# Patient Record
Sex: MALE | Race: WHITE | NOT HISPANIC OR LATINO | Employment: OTHER | URBAN - METROPOLITAN AREA
[De-identification: names, ages, dates, MRNs, and addresses within clinical notes are randomized per-mention and may not be internally consistent; named-entity substitution may affect disease eponyms.]

---

## 2021-01-15 DIAGNOSIS — Z23 NEED FOR VACCINATION: ICD-10-CM

## 2022-03-07 PROBLEM — M23.322 MEDIAL MENISCUS, POSTERIOR HORN DERANGEMENT, LEFT: Status: ACTIVE | Noted: 2022-03-07

## 2022-03-30 ENCOUNTER — TELEPHONE (OUTPATIENT)
Dept: SURGERY | Facility: MEDICAL CENTER | Age: 77
End: 2022-03-30
Payer: COMMERCIAL

## 2022-07-18 ENCOUNTER — TELEPHONE (OUTPATIENT)
Dept: CARDIOLOGY | Facility: MEDICAL CENTER | Age: 77
End: 2022-07-18

## 2022-07-18 NOTE — TELEPHONE ENCOUNTER
Chart Prep    Called patient in regards to records for NP appointment with Dr. Harris To review most recent lab results, ekg, any cardiac testing or ,if he has been treated by a cardiologist. No answer, unable to leave voicemail to call back due to voicemail not being set up.

## 2022-08-01 ENCOUNTER — RESEARCH ENCOUNTER (OUTPATIENT)
Dept: CARDIOLOGY | Facility: MEDICAL CENTER | Age: 77
End: 2022-08-01

## 2022-08-01 ENCOUNTER — OFFICE VISIT (OUTPATIENT)
Dept: CARDIOLOGY | Facility: MEDICAL CENTER | Age: 77
End: 2022-08-01
Payer: OTHER GOVERNMENT

## 2022-08-01 VITALS
BODY MASS INDEX: 38.08 KG/M2 | HEIGHT: 71 IN | RESPIRATION RATE: 18 BRPM | SYSTOLIC BLOOD PRESSURE: 160 MMHG | DIASTOLIC BLOOD PRESSURE: 80 MMHG | WEIGHT: 272 LBS | OXYGEN SATURATION: 91 % | HEART RATE: 94 BPM

## 2022-08-01 DIAGNOSIS — R94.31 ABNORMAL EKG: ICD-10-CM

## 2022-08-01 DIAGNOSIS — Z00.6 RESEARCH STUDY PATIENT: ICD-10-CM

## 2022-08-01 DIAGNOSIS — M79.89 LEG SWELLING: ICD-10-CM

## 2022-08-01 DIAGNOSIS — Z91.89 OTHER SPECIFIED PERSONAL RISK FACTORS, NOT ELSEWHERE CLASSIFIED: ICD-10-CM

## 2022-08-01 DIAGNOSIS — R06.09 DYSPNEA ON EXERTION: ICD-10-CM

## 2022-08-01 DIAGNOSIS — I10 HTN (HYPERTENSION), MALIGNANT: ICD-10-CM

## 2022-08-01 DIAGNOSIS — I49.1 PREMATURE ATRIAL COMPLEX: ICD-10-CM

## 2022-08-01 PROCEDURE — 99204 OFFICE O/P NEW MOD 45 MIN: CPT | Performed by: INTERNAL MEDICINE

## 2022-08-01 PROCEDURE — 93000 ELECTROCARDIOGRAM COMPLETE: CPT | Performed by: INTERNAL MEDICINE

## 2022-08-01 RX ORDER — BUPROPION HYDROCHLORIDE 150 MG/1
150 TABLET ORAL DAILY
COMMUNITY
Start: 2022-02-03 | End: 2022-08-01

## 2022-08-01 ASSESSMENT — ENCOUNTER SYMPTOMS
MYALGIAS: 0
DIAPHORESIS: 0
BLURRED VISION: 0
SENSORY CHANGE: 0
FALLS: 0
DIZZINESS: 0
FEVER: 0
ABDOMINAL PAIN: 0
MEMORY LOSS: 0
SHORTNESS OF BREATH: 1
COUGH: 0
HEADACHES: 0
DEPRESSION: 0
BRUISES/BLEEDS EASILY: 0
DOUBLE VISION: 0
PALPITATIONS: 0

## 2022-08-01 NOTE — PROGRESS NOTES
Chief Complaint   Patient presents with   • Abnormal EKG     NP f/V DX: Abnormal EKG        Subjective     Carlo Weaver is a 76 y.o. male who presents today for worsening exertional dyspnea. No prior heart problems. No prior cardiac procedures or surgeries. No chest pain. No syncope.    Patient does get winded upon walking up inclines or for distance. No symptoms at rest or with daily living activities.    I have personally interpreted EKG today with patient, there is no evidence of acute coronary syndrome, no evidence of prior infarct, normal UT and QT interval, no significant conduction disease. Sinus rhythm.    No family history of sudden cardiac death.      History reviewed. No pertinent past medical history.  Past Surgical History:   Procedure Laterality Date   • PB KNEE SCOPE,MED/LAT MENISECTOMY Left 4/15/2022    Procedure: Left knee arthroscopic partial medial meniscectomy;  Surgeon: Avel Pierson M.D.;  Location: [facility];  Service: Orthopedics     History reviewed. No pertinent family history.  Social History     Socioeconomic History   • Marital status: Unknown     Spouse name: Not on file   • Number of children: Not on file   • Years of education: Not on file   • Highest education level: Not on file   Occupational History   • Not on file   Tobacco Use   • Smoking status: Never Smoker   • Smokeless tobacco: Never Used   Vaping Use   • Vaping Use: Never used   Substance and Sexual Activity   • Alcohol use: Never   • Drug use: Never   • Sexual activity: Not on file   Other Topics Concern   • Not on file   Social History Narrative   • Not on file     Social Determinants of Health     Financial Resource Strain: Not on file   Food Insecurity: Not on file   Transportation Needs: Not on file   Physical Activity: Not on file   Stress: Not on file   Social Connections: Not on file   Intimate Partner Violence: Not on file   Housing Stability: Not on file     No Known Allergies  Outpatient  "Encounter Medications as of 8/1/2022   Medication Sig Dispense Refill   • buPROPion (WELLBUTRIN XL) 150 MG XL tablet Take 150 mg by mouth 2 times a day.     • mirtazapine (REMERON) 45 MG tablet TAKE 3 TABLETS BY MOUTH EVERY NIGHT AT BEDTIME     • FLUoxetine (PROZAC) 20 MG Cap Take 20 mg by mouth every day.     • clonazePAM (KLONOPIN) 1 MG Tab TAKE 1 TABLET BY MOUTH FOUR TIMES DAILY     • [DISCONTINUED] buPROPion (WELLBUTRIN XL) 150 MG XL tablet Take 150 mg by mouth every day. (Patient not taking: Reported on 8/1/2022)       No facility-administered encounter medications on file as of 8/1/2022.     Review of Systems   Constitutional: Negative for diaphoresis and fever.   HENT: Negative for nosebleeds.    Eyes: Negative for blurred vision and double vision.   Respiratory: Positive for shortness of breath. Negative for cough.    Cardiovascular: Negative for chest pain and palpitations.   Gastrointestinal: Negative for abdominal pain.   Genitourinary: Negative for dysuria and frequency.   Musculoskeletal: Negative for falls and myalgias.   Skin: Negative for rash.   Neurological: Negative for dizziness, sensory change and headaches.   Endo/Heme/Allergies: Does not bruise/bleed easily.   Psychiatric/Behavioral: Negative for depression and memory loss.              Objective     BP (!) 160/80 (BP Location: Left arm, Patient Position: Sitting, BP Cuff Size: Adult)   Pulse 94   Resp 18   Ht 1.803 m (5' 11\")   Wt 123 kg (272 lb)   SpO2 91%   BMI 37.94 kg/m²     Physical Exam  Vitals and nursing note reviewed.   Constitutional:       General: He is not in acute distress.     Appearance: He is not diaphoretic.   HENT:      Head: Normocephalic and atraumatic.      Right Ear: External ear normal.      Left Ear: External ear normal.      Nose: No congestion or rhinorrhea.   Eyes:      General:         Right eye: No discharge.         Left eye: No discharge.   Neck:      Thyroid: No thyromegaly.      Vascular: No JVD. "   Cardiovascular:      Rate and Rhythm: Normal rate and regular rhythm.      Pulses: Normal pulses.   Pulmonary:      Effort: No respiratory distress.   Abdominal:      General: There is no distension.      Tenderness: There is no abdominal tenderness.   Musculoskeletal:         General: No swelling or tenderness.      Right lower leg: Edema present.      Left lower leg: Edema present.   Skin:     General: Skin is warm and dry.   Neurological:      Mental Status: He is alert and oriented to person, place, and time.      Cranial Nerves: No cranial nerve deficit.   Psychiatric:         Behavior: Behavior normal.         Assessment & Plan     1. Dyspnea on exertion  EC-ECHOCARDIOGRAM COMPLETE W/O CONT    NM-CARDIAC STRESS TEST    REFERRAL TO SLEEP STUDIES    Referral to Pulmonary and Sleep Medicine    PULMONARY FUNCTION TESTS -Test requested: Complete Pulmonary Function Test   2. Abnormal EKG  EKG   3. Premature atrial complex  NM-CARDIAC STRESS TEST    Referral to Pulmonary and Sleep Medicine   4. Leg swelling  EC-ECHOCARDIOGRAM COMPLETE W/O CONT    US-EXTREMITY VENOUS LOWER BILAT   5. HTN (hypertension), malignant  EC-ECHOCARDIOGRAM COMPLETE W/O CONT   6. Other specified personal risk factors, not elsewhere classified  NM-CARDIAC STRESS TEST    Referral to Pulmonary and Sleep Medicine       Medical Decision Making: Today's Assessment/Status/Plan:   At this time, to further risk stratify, I will order a transthoracic echocardiogram to assess cardiac and valvular functions. I will also order a myocardial nuclear scan stress test to assess for coronary ischemia.    Blood pressure is elevated today, but patient would like to defer treatment at this time.    We will also check lower extremities venous duplex to assess the anatomy for her lower extremities venous system.    I will refer patient to have sleep studies for obstructive sleep apnea.

## 2022-08-02 LAB — EKG IMPRESSION: NORMAL

## 2022-08-04 ENCOUNTER — HOSPITAL ENCOUNTER (OUTPATIENT)
Dept: RADIOLOGY | Facility: MEDICAL CENTER | Age: 77
End: 2022-08-04
Attending: INTERNAL MEDICINE
Payer: COMMERCIAL

## 2022-08-04 DIAGNOSIS — I49.1 PREMATURE ATRIAL COMPLEX: ICD-10-CM

## 2022-08-04 DIAGNOSIS — R06.09 DYSPNEA ON EXERTION: ICD-10-CM

## 2022-08-04 DIAGNOSIS — Z91.89 OTHER SPECIFIED PERSONAL RISK FACTORS, NOT ELSEWHERE CLASSIFIED: ICD-10-CM

## 2022-08-04 PROCEDURE — A9502 TC99M TETROFOSMIN: HCPCS

## 2022-08-04 PROCEDURE — 700111 HCHG RX REV CODE 636 W/ 250 OVERRIDE (IP)

## 2022-08-04 RX ORDER — REGADENOSON 0.08 MG/ML
INJECTION, SOLUTION INTRAVENOUS
Status: COMPLETED
Start: 2022-08-04 | End: 2022-08-04

## 2022-08-04 RX ORDER — REGADENOSON 0.08 MG/ML
0.4 INJECTION, SOLUTION INTRAVENOUS ONCE
Status: COMPLETED | OUTPATIENT
Start: 2022-08-04 | End: 2022-08-04

## 2022-08-04 RX ADMIN — REGADENOSON 0.4 MG: 0.08 INJECTION, SOLUTION INTRAVENOUS at 15:47

## 2022-08-05 PROCEDURE — 93018 CV STRESS TEST I&R ONLY: CPT | Performed by: INTERNAL MEDICINE

## 2022-08-05 PROCEDURE — 78452 HT MUSCLE IMAGE SPECT MULT: CPT | Mod: 26 | Performed by: INTERNAL MEDICINE

## 2022-08-16 LAB
APOB+LDLR+PCSK9 GENE MUT ANL BLD/T: NOT DETECTED
BRCA1+BRCA2 DEL+DUP + FULL MUT ANL BLD/T: NOT DETECTED
MLH1+MSH2+MSH6+PMS2 GN DEL+DUP+FUL M: NOT DETECTED

## 2022-08-31 ENCOUNTER — APPOINTMENT (OUTPATIENT)
Dept: RADIOLOGY | Facility: MEDICAL CENTER | Age: 77
End: 2022-08-31
Attending: INTERNAL MEDICINE
Payer: OTHER GOVERNMENT

## 2022-09-01 ENCOUNTER — HOSPITAL ENCOUNTER (OUTPATIENT)
Dept: PULMONOLOGY | Facility: MEDICAL CENTER | Age: 77
End: 2022-09-01
Attending: INTERNAL MEDICINE
Payer: OTHER GOVERNMENT

## 2022-09-01 DIAGNOSIS — R06.09 DYSPNEA ON EXERTION: ICD-10-CM

## 2022-09-01 PROCEDURE — 94726 PLETHYSMOGRAPHY LUNG VOLUMES: CPT | Mod: 26 | Performed by: INTERNAL MEDICINE

## 2022-09-01 PROCEDURE — 94060 EVALUATION OF WHEEZING: CPT

## 2022-09-01 PROCEDURE — 94060 EVALUATION OF WHEEZING: CPT | Mod: 26 | Performed by: INTERNAL MEDICINE

## 2022-09-01 PROCEDURE — 94729 DIFFUSING CAPACITY: CPT

## 2022-09-01 PROCEDURE — 94726 PLETHYSMOGRAPHY LUNG VOLUMES: CPT

## 2022-09-01 PROCEDURE — 94729 DIFFUSING CAPACITY: CPT | Mod: 26 | Performed by: INTERNAL MEDICINE

## 2022-09-01 RX ORDER — ALBUTEROL SULFATE 2.5 MG/3ML
2.5 SOLUTION RESPIRATORY (INHALATION)
Status: DISCONTINUED | OUTPATIENT
Start: 2022-09-01 | End: 2022-09-02 | Stop reason: HOSPADM

## 2022-09-01 RX ADMIN — ALBUTEROL SULFATE 2.5 MG: 2.5 SOLUTION RESPIRATORY (INHALATION) at 15:32

## 2022-09-01 ASSESSMENT — PULMONARY FUNCTION TESTS
FEV1/FVC_PERCENT_PREDICTED: 95
FVC: 2.73
FVC_LLN: 3.51
FEV1/FVC_PERCENT_PREDICTED: 75
FVC_PERCENT_PREDICTED: 64
FEV1: 1.76
FEV1_LLN: 2.62
FEV1/FVC_PERCENT_LLN: 63
FEV1: 1.94
FVC_LLN: 3.51
FEV1_PERCENT_CHANGE: -3
FEV1/FVC_PERCENT_PREDICTED: 94
FVC: 2.82
FEV1/FVC: 62
FEV1/FVC_PREDICTED: 75
FVC_PREDICTED: 4.21
FEV1_PREDICTED: 3.14
FEV1/FVC_PERCENT_CHANGE: -333
FEV1/FVC_PERCENT_PREDICTED: 84
FEV1_PERCENT_CHANGE: 10
FEV1/FVC: 62
FEV1_PERCENT_PREDICTED: 61
FVC_PERCENT_PREDICTED: 67
FEV1/FVC_PERCENT_CHANGE: 13
FEV1_PERCENT_PREDICTED: 56
FEV1_LLN: 2.62
FEV1/FVC_PERCENT_LLN: 63
FEV1/FVC: 71
FEV1/FVC_PERCENT_PREDICTED: 82
FEV1/FVC: 71.06

## 2022-09-02 NOTE — PROCEDURES
DATE OF SERVICE:  09/01/2022     The results of this test meet the ATS standards for acceptability and   repeatability.     SPIROMETRY:  There is evidence of obstruction, manifested by loud ratio of   FEV1/FVC at 62%.  Noted that the FEV1 was 61% in the post-bronchodilator arm,   which correlates to 1.94 liters.     Although there was a trend to response to bronchodilators, this did not meet   the strict criteria from ATS.     LUNG VOLUMES:  There is evidence of air trapping manifested by an elevated RV   at 144%.  Noted that the TLC is still within normal limits at 89%.     DIFFUSION CAPACITY STUDY:  There is no impairment in the gas transfer,   manifested by a DLCO of 140%.     Flow volume curves: The flow volume curves correlates with the information above.     CONCLUSION:  There is evidence of moderate obstruction with air trapping,   clinical and radiographic correlation is recommended for entities such as COPD, overlap syndrome, asthma, among others.      Tacho Ward MD [facility]  Pulmonary/Critical Care          ______________________________  Tacho Ward MD    [facility] [place]    DD:  09/01/2022 19:27  DT:  09/01/2022 19:42    Job#:  [id number]

## 2022-11-16 ENCOUNTER — HOSPITAL ENCOUNTER (OUTPATIENT)
Dept: CARDIOLOGY | Facility: MEDICAL CENTER | Age: 77
End: 2022-11-16
Attending: INTERNAL MEDICINE
Payer: COMMERCIAL

## 2022-11-16 DIAGNOSIS — I10 HTN (HYPERTENSION), MALIGNANT: ICD-10-CM

## 2022-11-16 DIAGNOSIS — R06.09 DYSPNEA ON EXERTION: ICD-10-CM

## 2022-11-16 DIAGNOSIS — M79.89 LEG SWELLING: ICD-10-CM

## 2022-11-16 LAB
LV EJECT FRACT  99904: 60
LV EJECT FRACT MOD 4C 99902: 52.64

## 2022-11-16 PROCEDURE — 93306 TTE W/DOPPLER COMPLETE: CPT | Mod: 26 | Performed by: INTERNAL MEDICINE

## 2022-11-16 PROCEDURE — 93306 TTE W/DOPPLER COMPLETE: CPT

## 2022-11-17 NOTE — RESULT ENCOUNTER NOTE
Dear Kemi,    Can you please let Carlo Weaver know that result is ok and I will see patient as scheduled?    Thank you,  Josse.

## 2022-11-18 ENCOUNTER — OFFICE VISIT (OUTPATIENT)
Dept: CARDIOLOGY | Facility: MEDICAL CENTER | Age: 77
End: 2022-11-18
Payer: COMMERCIAL

## 2022-11-18 VITALS
HEART RATE: 88 BPM | HEIGHT: 71 IN | SYSTOLIC BLOOD PRESSURE: 134 MMHG | BODY MASS INDEX: 37.66 KG/M2 | RESPIRATION RATE: 18 BRPM | WEIGHT: 269 LBS | DIASTOLIC BLOOD PRESSURE: 94 MMHG | OXYGEN SATURATION: 94 %

## 2022-11-18 DIAGNOSIS — R94.31 ABNORMAL EKG: ICD-10-CM

## 2022-11-18 DIAGNOSIS — Z71.89 COUNSELING ON HEALTH PROMOTION AND DISEASE PREVENTION: ICD-10-CM

## 2022-11-18 DIAGNOSIS — R03.0 ELEVATED BLOOD PRESSURE READING: ICD-10-CM

## 2022-11-18 DIAGNOSIS — E66.09 CLASS 2 OBESITY DUE TO EXCESS CALORIES WITHOUT SERIOUS COMORBIDITY WITH BODY MASS INDEX (BMI) OF 35.0 TO 35.9 IN ADULT: ICD-10-CM

## 2022-11-18 DIAGNOSIS — I51.7 LVH (LEFT VENTRICULAR HYPERTROPHY): ICD-10-CM

## 2022-11-18 DIAGNOSIS — J44.9 OBSTRUCTIVE AIRWAY DISEASE (HCC): ICD-10-CM

## 2022-11-18 DIAGNOSIS — R06.09 DYSPNEA ON EXERTION: Primary | ICD-10-CM

## 2022-11-18 PROCEDURE — 99203 OFFICE O/P NEW LOW 30 MIN: CPT | Performed by: INTERNAL MEDICINE

## 2022-11-18 RX ORDER — ALBUTEROL SULFATE 2.5 MG/3ML
2.5 SOLUTION RESPIRATORY (INHALATION) EVERY 6 HOURS PRN
Qty: 1 EACH | Refills: 3 | Status: SHIPPED | OUTPATIENT
Start: 2022-11-18 | End: 2023-07-19

## 2022-11-18 NOTE — PROGRESS NOTES
CARDIOLOGY Follow Up PATIENT:    PCP: JUAN LUIS Benavides    1. Dyspnea on exertion    2. Elevated blood pressure reading    3. Class 2 obesity due to excess calories without serious comorbidity with body mass index (BMI) of 35.0 to 35.9 in adult    4. LVH (left ventricular hypertrophy)    5. Abnormal EKG    6. Obstructive airway disease (HCC)    7. Counseling on health promotion and disease prevention        Carlo Weaver here for follow up regarding his SOB and recent testing.    Chief Complaint   Patient presents with    Shortness of Breath       History: Carlo Weaver is a 77 y.o. male evaluated in our clinic by Dr. Melendez for dyspnea on exertion and abnormal EKG on 8/1/22, and subsequently had a TTE and stress test formed. Has psoriasis and depression. Never smoked, never exposed to tobacco.    Had COVID January 2020 and ever since been having the SOB issues.    Continues to have low exercise tolerance, RENAE, no more having DENNY. No orthopnea, PND, LH, dizziness, syncope, presyncope, CP. No exercise routine, sedentary.    No BP check at home. No machine.    Reportedly negative C scope few months ago.    No added salt to food but does not watch salt intake.    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    Lipid panel 11/16/22:  , HDL 62,     Normal CBC, CrCl, AST and ALT 11/2022.    PFT 9/1/22:  There is evidence of moderate obstruction with air trapping, clinical and radiographic correlation is recommended for entities such as COPD, overlap syndrome, asthma, among others.  Tacho Ward MD FACP  Pulmonary/Critical Care    TTE 11/16/22:  No prior study is available for comparison.   Normal left ventricular systolic function.   No evidence of valvular abnormality based on Doppler evaluation.  Grade I diastolic dysfunction.  Mild LVH.  Unable to estimate right ventricular systolic   pressure due to an inadequate tricuspid regurgitant jet.    Cardiac nuclear stress test 8/5/22:   NUCLEAR IMAGING  "INTERPRETATION   No evidence of significant jeopardized viable myocardium or prior myocardial    infarction.   Normal left ventricular size, ejection fraction, and wall motion.   ECG INTERPRETATION   Negative stress ECG for ischemia.  Normal TID 0.98      PE:  BP (!) 134/94 (BP Location: Left arm, Patient Position: Sitting, BP Cuff Size: Adult)   Pulse 88   Resp 18   Ht 1.803 m (5' 11\")   Wt 122 kg (269 lb)   SpO2 94%   BMI 37.52 kg/m²     Gen: well  HEENT: Symmetric face. Anicteric sclerae. Moist mucus membranes  NECK: No JVD. No lymphadenopathy  CARDIAC: Regular, Normal S1, S2, No murmur  VASCULATURE: carotids are normal bilaterally without bruit  RESP: Clear to auscultation bilaterally  ABD: Soft, non-tender, non-distended  EXT: Trace lower extremity edema, no clubbing or cyanosis  SKIN: Warm and dry  NEURO: No gross deficits  PSYCH: Appropriate affect, participates in conversation    The ASCVD Risk score (Geneva DK, et al., 2019) failed to calculate.    History reviewed. No pertinent past medical history.  Past Surgical History:   Procedure Laterality Date    PB KNEE SCOPE,MED/LAT MENISECTOMY Left 4/15/2022    Procedure: Left knee arthroscopic partial medial meniscectomy;  Surgeon: Avel Pierson M.D.;  Location: [facility];  Service: Orthopedics     No Known Allergies  Outpatient Encounter Medications as of 11/18/2022   Medication Sig Dispense Refill    neomycin sulf/polymyx B sulf/HC soln (CORTISPORIN HC SOL) 3.5-53068-4 Solution INSTILL 4 DROPS TO AFFECTED EAR THREE TIMES DAILY      tiotropium (SPIRIVA RESPIMAT) 2.5 mcg/Act Aero Soln Inhale 2 Inhalation every day. 1 Each 3    albuterol (PROVENTIL) 2.5mg/3ml Nebu Soln solution for nebulization Take 3 mL by nebulization every 6 hours as needed for Shortness of Breath. 1 Each 3    buPROPion (WELLBUTRIN XL) 150 MG XL tablet Take 1 Tablet by mouth 2 times a day.      mirtazapine (REMERON) 45 MG tablet TAKE 3 TABLETS BY MOUTH EVERY " NIGHT AT BEDTIME      FLUoxetine (PROZAC) 20 MG Cap Take 1 Capsule by mouth every day.      clonazePAM (KLONOPIN) 1 MG Tab TAKE 1 TABLET BY MOUTH FOUR TIMES DAILY       No facility-administered encounter medications on file as of 11/18/2022.     Social History     Socioeconomic History    Marital status: Single     Spouse name: Not on file    Number of children: Not on file    Years of education: Not on file    Highest education level: Not on file   Occupational History    Not on file   Tobacco Use    Smoking status: Never    Smokeless tobacco: Never   Vaping Use    Vaping Use: Never used   Substance and Sexual Activity    Alcohol use: Yes     Comment: very rarely    Drug use: Never    Sexual activity: Not on file   Other Topics Concern    Not on file   Social History Narrative    Not on file     Social Determinants of Health     Financial Resource Strain: Not on file   Food Insecurity: Not on file   Transportation Needs: Not on file   Physical Activity: Not on file   Stress: Not on file   Social Connections: Not on file   Intimate Partner Violence: Not on file   Housing Stability: Not on file     Family History   Problem Relation Age of Onset    Lung Disease Mother     Diabetes Mother     Cancer Father     Heart Disease Neg Hx          Studies    No results found for: TSHULTRASEN   No results found for: FREET4   No results found for: HBA1C  No results found for: CHOLSTRLTOT, LDL, HDL, TRIGLYCERIDE    No results found for: SODIUM, POTASSIUM, CHLORIDE, CO2, GLUCOSE, BUN, CREATININE, BUNCREATRAT, GLOMRATE  No results found for: ALKPHOSPHAT, ASTSGOT, ALTSGPT, TBILIRUBIN     Echocardiogram:  No results found for this or any previous visit.        Assessment and Recommendations:    Problem List Items Addressed This Visit       LVH (left ventricular hypertrophy)    Dyspnea on exertion - Primary    Relevant Medications    tiotropium (SPIRIVA RESPIMAT) 2.5 mcg/Act Aero Soln    albuterol (PROVENTIL) 2.5mg/3ml Nebu Soln  solution for nebulization    Elevated blood pressure reading    Class 2 obesity due to excess calories without serious comorbidity with body mass index (BMI) of 35.0 to 35.9 in adult     Other Visit Diagnoses       Abnormal EKG        Obstructive airway disease (HCC)        Relevant Medications    tiotropium (SPIRIVA RESPIMAT) 2.5 mcg/Act Aero Soln    albuterol (PROVENTIL) 2.5mg/3ml Nebu Soln solution for nebulization    Counseling on health promotion and disease prevention              I reviewed the recent echocardiogram and stress test result findings with  and advised him to send me a blood pressure log, a.m. and p.m., in 1 week to ensure controlled blood pressure at home especially given elevated blood pressure reading in clinic today with LVH finding on his transthoracic echocardiogram.    He is scheduled to follow-up with pulmonary 1/11/23 given his abnormal PFT findings of obstructive lung disease.  I also prescribed him albuterol as needed with Spiriva daily inhalers given that he is not seeing pulmonary on till next year.  These medications to be further managed by either PCP or pulmonary medicine.    Accordingly, can further advise regarding medication changes especially if hypertension is confirmed.  If after treating hypertension (if present) and Spiriva use, his symptoms are still persistent of RENAE, we will arrange for a right heart catheterization.      After the visit, we obtained records for his recent lab work at [facility], estimated 10-year ASCVD risk about 25% however guidelines recommend moderate intensity statin might be reasonable, class IIb indication given his age older than 75. It is also a class IIb indication to obtain a coronary calcium score for further risk assessment.  I reach out to the patient regarding these recommendations via [employer] after the visit.    Discussed with patient in details importance of eating a heart healthy diet and participating in more aerobic exercise, at  least 30 minutes a day to help with his weight loss; general cardiovascular fitness and probably deconditioning state.    Thank you for the opportunity to be involved in Carlo Weaver 's care; and please reach out with any questions or concerns.    Return in about 3 months (around 2/18/2023).    Dashawn Jackson MD, MPH [facility]  Interventional Cardiologist  [facility]   of Clinical Internal Medicine - [facility]    ~ Portions of this note were completed using voice recognition software (Dragon Naturally speaking software) . Occasional transcription errors may have escaped proof reading. I have made every reasonable attempt to correct obvious errors, but I expect that there are errors of grammar and possibly content that I did not discover before finalizing the note. ~

## 2022-11-18 NOTE — PATIENT INSTRUCTIONS
Spiriva 2 puffs daily  Albuterol as needed every 6 hours for shortness of breath and/or wheezing    Checking Blood Pressure:  -Blood pressure cuff, spend in the $40-65, with good return policy  -It should be automatic, upper arm, measure your arm to get the correct size, probably adult Large but your arm should be under 16.5-17 cm. If you need an XL cuff, you will have to have it special ordered from a pharmacy or durable medical equipment company.  -Put the cuff in place, rest arm on table near height of your heart, sit quietly for 5 min, legs uncrossed, with back support, then take your blood pressure, write it down, keep a log  -Check your blood pressure in the morning and evening, and send me your log in 1 weeks, either through [employer] or by calling our office.  -Can bring your cuff to at least one appointment where it can be calibrated to a manual cuff if you are concerned.  -Goal blood pressure is under 120/80.  If you think your BP is overall too high, let us know in the office, we can adjust medications, can use [employer] or call the [facility] office: [phone].    Salt=sodium=sea salt, guidelines say stay under 2,500 mg daily  Get salt smart, start looking at labels, count it up.  Salt is hidden in everything, salad dressing, sauces, cheese, most canned food, any processed meat.

## 2022-11-30 DIAGNOSIS — J44.9 OBSTRUCTIVE AIRWAY DISEASE (HCC): ICD-10-CM

## 2022-11-30 RX ORDER — BUDESONIDE AND FORMOTEROL FUMARATE DIHYDRATE 80; 4.5 UG/1; UG/1
2 AEROSOL RESPIRATORY (INHALATION) 2 TIMES DAILY
Qty: 1 EACH | Refills: 3 | Status: SHIPPED | OUTPATIENT
Start: 2022-11-30 | End: 2023-02-14 | Stop reason: SDUPTHER

## 2023-01-10 ENCOUNTER — TELEPHONE (OUTPATIENT)
Dept: SLEEP MEDICINE | Facility: MEDICAL CENTER | Age: 78
End: 2023-01-10
Payer: COMMERCIAL

## 2023-01-10 NOTE — TELEPHONE ENCOUNTER
SLEEP - NEW PATIENT CHART PREP COMPLETED ON 01/10/2023    SCHEDULED FOLLOW UP 01/11/2023    Ref by Dr. Melendez Dx: Suspected sleep apnea , Premature atrial complex    Referring OV Notes Reviewed : YES     Is Pt currently on PAP? NO .If yes, contact patient as a remind to bring device with them.    Nocturnal Oxygen :NO . If yes, Liter Flow? N/A    Any prior Sleep Studies on file? NO. If yes, when? N/A    Previously seen with [facility]? NO If yes, when? N/A    Previous PMA Patient? NO

## 2023-01-11 ENCOUNTER — OFFICE VISIT (OUTPATIENT)
Dept: SLEEP MEDICINE | Facility: MEDICAL CENTER | Age: 78
End: 2023-01-11
Payer: COMMERCIAL

## 2023-01-11 VITALS
HEART RATE: 81 BPM | BODY MASS INDEX: 36.57 KG/M2 | WEIGHT: 270 LBS | HEIGHT: 72 IN | OXYGEN SATURATION: 94 % | RESPIRATION RATE: 16 BRPM | DIASTOLIC BLOOD PRESSURE: 84 MMHG | SYSTOLIC BLOOD PRESSURE: 132 MMHG

## 2023-01-11 DIAGNOSIS — R53.82 CHRONIC FATIGUE, UNSPECIFIED: ICD-10-CM

## 2023-01-11 DIAGNOSIS — G47.8 POOR SLEEP PATTERN: ICD-10-CM

## 2023-01-11 DIAGNOSIS — G47.30 BREATHING-RELATED SLEEP DISORDER: ICD-10-CM

## 2023-01-11 PROCEDURE — 99204 OFFICE O/P NEW MOD 45 MIN: CPT | Performed by: PREVENTIVE MEDICINE

## 2023-01-11 NOTE — PROGRESS NOTES
CHIEF COMPLAINT: “I guess, I need a sleep study.”    HISTORY OF PRESENT ILLNESS: Mr. Weaver is a 77-year-old male referred by YESSENIA Benavides and is  here for a sleep medicine consultation. This patient reports being a little unclear as to why he has been referred  to sleep medicine. He is also expecting a referral to general pulmonology. He reports that he has daily shortness  of breath and has been diagnosed with COPD.    SLEEP HISTORY: The patient prefers to go to sleep late at 1 a.m. and wake up at 10 a.m. and his daily activities  allow for that late to wake up time. This patient denies ever having had a sleep study previously. He also denies  snoring and denies any witnessed apneas in his sleep. He goes to bed at 1 a.m. and wakes up at 10 a.m. and he  falls asleep in 5-40 minutes. He does not routinely feel refreshed in the morning. However, he does not wake up  frequently at night either. He rarely falls asleep unintentionally and he will take a nap rarely. He reports that he  grinds and conches his teeth for which he uses a mouthguard. His [place] score is 1/24 and this is not  consistent with excessive daytime sleepiness.    SIGNIFICANT FOR COMORBIDITIES AND MODIFYING FACTORS: See below  ? Left ventricular hypertrophy.  ? Dyspnea on exertion.  ? Elevated blood pressure readings.  ? Class II obesity due to excessive calories without serious comorbidity with a BMI of 36.62.  ? Intermittent wheezing.    PAST PERTINENT SURGICAL HISTORY: Tonsillectomy as a young man.    SOCIAL HISTORY: The patient is single. He currently continues to work. He reports that he has never used  tobacco products. He does drink alcohol one standard drink per week.    MEDICATIONS:  ? Symbicort inhaled 2 puffs 2 times daily.  ? Proventil take 3 mL by nebulization every 6 hours as needed for shortness of breath.  ? Bupropion 150 mg 1 tablet twice a day.  ? Fluoxetine 20 mg 1 tablet q.a.m.  ? Klonopin1 tablet by mouth q.i.d.as  needed.    REVIEW OF SYSTEMS: Please refer to HPI and sleep history for pertinent positives.  PHYSICAL EXAMINATION:  VITALS: Blood pressure 132/84, pulse 81, respirations 16. Height 6 feet. Weight 270 pounds. Oxygen saturation  on room air 94%. BMI 36.6  NECK CIRCUMFERENCE: 16 inches  Appearance: Moderately obese male, looks stated age  Eyes: EOMI  ENMT: Mallampati 2  Neck: Supple  Lung auscultation: No wheezes, rhonchi, rubs, or gallops.  Cardiac: Regular rate and rhythm.  Musculoskeletal: Grossly normal gait and station  Neurologic: Oriented to person, time, place, and purpose.    MEDICAL DECISION MAKING:  The medical record was reviewed as it pertains to this referral. This includes records from primary care,  consultants notes, referral request, hospital records, labs and imaging. Any available diagnostic and titration  nocturnal polysomnograms, home sleep apnea tests, continuous nocturnal oximetry results, multiple sleep  latency tests, and recent compliance reports were reviewed with the patient.    ASSESSMENT/PLAN:  This patient is a 77-year-old male who has very few signs and symptoms of sleep apnea. As such, he does not  have a high pretest probability of having obstructive sleep apnea. This is why he must undergo an in-lab  polysomnography. Otherwise, it is highly likely we will get a false negative on this patient.    1. Breathing related sleep disorder.  - Overnight Home Sleep Study; Future     2. Poor sleep pattern.  - Overnight Home Sleep Study; Future     3. Chronic fatigue, unspecified.  - Overnight Home Sleep Study; Future    The risks of untreated sleep apnea were discussed with the patient at length. Patients with YANI are at increased  risk of cardiovascular disease including coronary artery disease, systemic arterial hypertension, pulmonary  arterial hypertension, cardiac arrhythmias, and stroke. YANI patients have an increased risk of motor vehicle  accidents, type 2 diabetes, chronic kidney  disease, and non-alcoholic liver disease. The patient was advised to  avoid driving a motor vehicle when drowsy.  I have advised the patient to follow up with the appropriate healthcare practitioners for all other medical problems  and issues.    RETURN TO CLINIC: Return in 3 weeks after sleep study to discuss with any available provider.    My total time spent caring for the patient on the day of the encounter was 40 minutes. This includes time spent on  a thorough chart review including other physician notes, all sleep studies, as well as critical labs and pulmonary  and cardiac studies. Additionally, it includes a thorough discussion of good sleep hygiene and stimulus control, as  well as the need for consistency in terms of sleep preparation and practice.          Answers submitted by the patient for this visit:  Sleep Center Questionnaire (Submitted on 1/10/2023)  Year of your last physical exam: feb 2022  Results of exam: referal  [facility]  Occupation : retired  Height: 6'  Current weight: 270  6 months ago: 270  At age 21: 155  What is the reason for your visit today?: shortness of breath   COPD  Name of person referring you to the Sleep Center: To  Have you ever been hospitalized?: Yes  Reason, year, and hospital in which you were hospitalized:: 2022  knee surgery  Have you ever had problems with anesthesia?: No  Have you experienced post-operative delirium?: No  Any complications with surgery?: No  What year did you receive your last Flu shot?: 2022  What year did you receive you last Pneumonia shot?: 2022  Have you had a TB skin test? If so, please list the year and result:: 2015  Have you had Allergy skin testing? If so, please list the year and result:: no  Please briefly describe your sleep problem and how old you were when it began.: 2020  How does this affect your daily life and activities? Please also rate how serious of a problem this is (1 = Not at all, 10 = Very Serious).: 7  Have  you had any previous evaluations, examinations, or treatment for this sleep problem or any other problems with your sleep? If so, please describe the evaluation, treatment, and results.: no  Have you used any medications (prescribed or otherwise) to help your sleep problem? If yes, include name, amount, frequency, and the prescribing physician.: no  What time do you usually go to bed and wake up on: Weekdays? Weekends?: 1 am  10 am  Do you have a regular bed partner?: No  How many minutes does it usually take to fall asleep at night after turning off the lights?: 5-40 minutes  What do you ordinarily do just prior to turning out the lights and attempting to go to sleep (e.g., reading, TV, baths, etc.)?: tv  On average, how many times do you wake up during the night?: 1  On average, how many times do you wake up to use the bathroom?: 0  Do you often wake up too early in the morning and are unable to return to sleep?: usually not  On average, how many hours of sleep do you get per night?: 9  How do you usually awaken?  Alarm, spontaneously, or other?: 50% alarm  50%  spontaneously  Is it difficult for you to awaken and get out of bed after sleeping? (Not at all, Sometimes, Very): 20 minutes  Do you nap or return to bed after arising?: no  Are you bothered by sleepiness during the day?: no  Do you feel that you get too much sleep at night?: no  Do you feel that you get too little sleep at night?: no  Do you usually feel tired during the day? If so, what do you attribute this to?: short of breath  Do you find yourself falling asleep when you don't mean to? : once a month  If yes, how long does your sleep episode last?: 30 minutes  Do you feel rested or refreshed after the sleep episode?: Yes  Have you ever suddenly fallen?: no  Have you ever experienced sudden body weakness?: no  Have you ever experienced weakness or paralysis upon going to sleep?: no  Have you ever experienced weakness or paralysis upon awakening from  "sleep?: no  Have you ever experienced seeing things or hearing voices/noises: That weren't real? On going to sleep? During the night? On awakening from sleep? During the day?: no  Do you have difficulty breathing at night? If yes, briefly describe.: no  Have you been told you snore while asleep? If so, does it disturb a bed partner (or someone in the same room), or someone in the next room?: no  Have you ever experienced doing something without being aware of the action? If yes, please describe.: no  Have you ever experienced upon lying in bed before sleep or on awakening from sleep: Restlessness of legs, \"nervous legs\", \"creeping crawling\" sensation of legs, or twitching of legs?: no  Have you ever been told that your arms or legs jerk or twitch while you are asleep? If yes, how many times per night does this occur?: no  Do you know, or have you ever been told that you do any of the following while sleeping: talk, walk, grit teeth, wet the bed, wake up screaming or seemingly afraid, have disturbing dreams, have unusual movements, wake up with headaches, (males) have erections? If yes to any of these, please indicate how many times per week, age started, last occurrence, treatment received.: grind-clench  teeth,   wear a mouth guard  Has anyone in your family been known to have any sleep problems? If yes, please list the type of problem (e.g., trouble getting to sleep, too sleepy, bed wetting, etc.), the relationship of this person to you, and the treatment received.: no    "

## 2023-01-23 ENCOUNTER — PATIENT MESSAGE (OUTPATIENT)
Dept: CARDIOLOGY | Facility: MEDICAL CENTER | Age: 78
End: 2023-01-23
Payer: COMMERCIAL

## 2023-01-25 NOTE — PATIENT COMMUNICATION
TW: Please review BP and advise per care team if any recommendations. FV with AL 2/22/23. Not currently on BP medications. Thank you!     169/101 one evening  136/88  141/91  around  3pm  134/83  10pm  often about 144/84  132/84    134/85    AL: CHANTEL. Thank you!

## 2023-01-26 NOTE — PATIENT COMMUNICATION
Adam Brody M.D.  You 5 hours ago (10:30 AM)     This can wait until primary physician returns      TW recommendations noted above.     AL: Please review and advise. FV 2/22/23.  Thank you!

## 2023-01-30 DIAGNOSIS — I10 ESSENTIAL HYPERTENSION, BENIGN: ICD-10-CM

## 2023-01-30 RX ORDER — LISINOPRIL 10 MG/1
10 TABLET ORAL DAILY
Qty: 90 TABLET | Refills: 3 | Status: SHIPPED | OUTPATIENT
Start: 2023-01-30 | End: 2023-02-16

## 2023-01-30 NOTE — PATIENT COMMUNICATION
Noted AL request below:    Dashawn Jackson M.D.  You 1 hour ago (2:17 PM)     Thank you Valeria. Can we please get  a copy of his most recent labs from 2022 at [facility]. I recall we did but I do not see it scanned in his media        GARRISON Salinas: Please request most recent labs from [facility] for AL. Thank you!!

## 2023-02-01 ENCOUNTER — TELEPHONE (OUTPATIENT)
Dept: CARDIOLOGY | Facility: MEDICAL CENTER | Age: 78
End: 2023-02-01
Payer: COMMERCIAL

## 2023-02-14 ENCOUNTER — OFFICE VISIT (OUTPATIENT)
Dept: SLEEP MEDICINE | Facility: MEDICAL CENTER | Age: 78
End: 2023-02-14
Payer: OTHER GOVERNMENT

## 2023-02-14 VITALS
BODY MASS INDEX: 35.89 KG/M2 | SYSTOLIC BLOOD PRESSURE: 130 MMHG | OXYGEN SATURATION: 94 % | HEART RATE: 91 BPM | WEIGHT: 265 LBS | RESPIRATION RATE: 16 BRPM | HEIGHT: 72 IN | DIASTOLIC BLOOD PRESSURE: 76 MMHG

## 2023-02-14 DIAGNOSIS — R06.02 SOB (SHORTNESS OF BREATH): ICD-10-CM

## 2023-02-14 DIAGNOSIS — J44.9 OBSTRUCTIVE AIRWAY DISEASE (HCC): ICD-10-CM

## 2023-02-14 PROCEDURE — 99204 OFFICE O/P NEW MOD 45 MIN: CPT | Performed by: INTERNAL MEDICINE

## 2023-02-14 RX ORDER — ALBUTEROL SULFATE 90 UG/1
1-2 AEROSOL, METERED RESPIRATORY (INHALATION) EVERY 4 HOURS PRN
Qty: 1 EACH | Refills: 6 | Status: SHIPPED | OUTPATIENT
Start: 2023-02-14

## 2023-02-14 RX ORDER — BUDESONIDE AND FORMOTEROL FUMARATE DIHYDRATE 80; 4.5 UG/1; UG/1
2 AEROSOL RESPIRATORY (INHALATION) 2 TIMES DAILY
Qty: 1 EACH | Refills: 6 | Status: SHIPPED | OUTPATIENT
Start: 2023-02-14

## 2023-02-14 ASSESSMENT — ENCOUNTER SYMPTOMS
DEPRESSION: 0
SHORTNESS OF BREATH: 0
DOUBLE VISION: 0
DIZZINESS: 0
FALLS: 0
BACK PAIN: 0
SINUS PAIN: 0
CHILLS: 0
FEVER: 0
TREMORS: 0
WEIGHT LOSS: 0
CLAUDICATION: 0
MYALGIAS: 0
SPUTUM PRODUCTION: 1
HEARTBURN: 0
EYE PAIN: 0
COUGH: 1
BLURRED VISION: 0
ABDOMINAL PAIN: 0
CONSTIPATION: 0
PALPITATIONS: 0
DIAPHORESIS: 0
STRIDOR: 0
VOMITING: 0
SPEECH CHANGE: 0
NECK PAIN: 0
EYE REDNESS: 0
HEADACHES: 0
WEAKNESS: 0
ORTHOPNEA: 0
EYE DISCHARGE: 0
NAUSEA: 0
HEMOPTYSIS: 0
WHEEZING: 0
FOCAL WEAKNESS: 0
DIARRHEA: 0
PHOTOPHOBIA: 0
SORE THROAT: 0
PND: 0

## 2023-02-15 NOTE — PROGRESS NOTES
"Chief Complaint   Patient presents with    Establish Care     Ref by: Lukas Melendez M.D. DX: Dyspnea on exertion    Results     [place] 9/1/22       HPI: This patient is a 77 y.o. male presenting for evaluation of RENAE.  PMHx is significant for HTN, MDD and recurrent bone spurs. He is a life-long non-smoker. He is a retired pharmacist. He has no family hx of atopic disease. He had a severe respiratory infection in 1/2020 which caused cough and respiratory sx for 20 days. No hospitalization. He believes it may have been covid. He has had daily cough, mainly dry but also reports frequent, clear mucous production not associated with his cough since that time. He reports \"fatigue\" with minimal activity, does yard work in 15 minute intervals when the weather is nice. He previously hiked regularly but has not been able to do this since 2014 due to bone spurs and recurrent surgical procedures leaving him fairly inactive. He was seen by cardiology and nuclear stress test from August showed no evidence of stress-induced ischemia.  Echocardiogram from November showed normal LV and RV function although he did have grade 1 diastolic dysfunction.  Insignificant TR jet to estimate RVSP.  He had pulmonary function testing from September which showed baseline airflow obstruction with an FEV1/FVC ratio of 62 and an FEV1 of 61% predicted.  He did have trend toward bronchodilator responsiveness with normal total lung capacity, mild air trapping and elevated DLCO.  He was started on nebulized albuterol and Symbicort with cardiology.  He has been using the albuterol via nebulizer up to 3 times daily but regimen is difficult to keep up and he has not noted significant benefit in daily cough or fatigue/shortness of breath with activity.  He has not been using Symbicort more than 2 to 3 days/week.    Past Medical History:   Diagnosis Date    Chickenpox     COPD (chronic obstructive pulmonary disease) (HCC)     Cough     Depression     " Fatigue     [place] measles     Hypertension     Influenza     Mumps     Obesity     Scarlet fever     Shortness of breath     Sputum production     Swelling of lower extremity     Wears glasses     Wheezing        Social History     Socioeconomic History    Marital status: Single     Spouse name: Not on file    Number of children: Not on file    Years of education: Not on file    Highest education level: Not on file   Occupational History    Not on file   Tobacco Use    Smoking status: Never    Smokeless tobacco: Never   Vaping Use    Vaping Use: Never used   Substance and Sexual Activity    Alcohol use: Not Currently     Alcohol/week: 0.6 oz     Types: 1 Standard drinks or equivalent per week     Comment: once a month    Drug use: Never    Sexual activity: Not on file   Other Topics Concern    Not on file   Social History Narrative    Not on file     Social Determinants of Health     Financial Resource Strain: Not on file   Food Insecurity: Not on file   Transportation Needs: Not on file   Physical Activity: Not on file   Stress: Not on file   Social Connections: Not on file   Intimate Partner Violence: Not on file   Housing Stability: Not on file       Family History   Problem Relation Age of Onset    Lung Disease Mother     Diabetes Mother     Cancer Father     Heart Disease Neg Hx        Current Outpatient Medications on File Prior to Visit   Medication Sig Dispense Refill    lisinopril (PRINIVIL) 10 MG Tab Take 1 Tablet by mouth every day. 90 Tablet 3    neomycin sulf/polymyx B sulf/HC soln (CORTISPORIN HC SOL) [id number] Solution INSTILL 4 DROPS TO AFFECTED EAR THREE TIMES DAILY      albuterol (PROVENTIL) 2.5mg/3ml Nebu Soln solution for nebulization Take 3 mL by nebulization every 6 hours as needed for Shortness of Breath. 1 Each 3    buPROPion (WELLBUTRIN XL) 150 MG XL tablet Take 1 Tablet by mouth 2 times a day.      mirtazapine (REMERON) 45 MG tablet TAKE 3 TABLETS BY MOUTH EVERY NIGHT AT BEDTIME       FLUoxetine (PROZAC) 20 MG Cap Take 1 Capsule by mouth every day.      clonazePAM (KLONOPIN) 1 MG Tab TAKE 1 TABLET BY MOUTH FOUR TIMES DAILY       No current facility-administered medications on file prior to visit.       Allergies: Patient has no known allergies.    ROS:   Review of Systems   Constitutional:  Positive for malaise/fatigue. Negative for chills, diaphoresis, fever and weight loss.   HENT:  Negative for congestion, ear discharge, ear pain, hearing loss, nosebleeds, sinus pain, sore throat and tinnitus.    Eyes:  Negative for blurred vision, double vision, photophobia, pain, discharge and redness.   Respiratory:  Positive for cough and sputum production. Negative for hemoptysis, shortness of breath, wheezing and stridor.    Cardiovascular:  Negative for chest pain, palpitations, orthopnea, claudication, leg swelling and PND.   Gastrointestinal:  Negative for abdominal pain, constipation, diarrhea, heartburn, nausea and vomiting.   Genitourinary:  Negative for dysuria and urgency.   Musculoskeletal:  Negative for back pain, falls, joint pain, myalgias and neck pain.   Skin:  Negative for itching and rash.   Neurological:  Negative for dizziness, tremors, speech change, focal weakness, weakness and headaches.   Endo/Heme/Allergies:  Negative for environmental allergies.   Psychiatric/Behavioral:  Negative for depression.      /76 (BP Location: Right arm, Patient Position: Sitting, BP Cuff Size: Adult)   Pulse 91   Resp 16   Ht 1.829 m (6')   Wt 120 kg (265 lb)   SpO2 94%     Physical Exam:  Physical Exam  Vitals reviewed.   Constitutional:       General: He is not in acute distress.     Appearance: Normal appearance. He is obese.   HENT:      Head: Normocephalic and atraumatic.      Right Ear: External ear normal.      Left Ear: External ear normal.      Nose: Nose normal. No congestion.      Mouth/Throat:      Mouth: Mucous membranes are moist.      Pharynx: Oropharynx is clear. No  oropharyngeal exudate.   Eyes:      General: No scleral icterus.     Extraocular Movements: Extraocular movements intact.      Conjunctiva/sclera: Conjunctivae normal.      Pupils: Pupils are equal, round, and reactive to light.   Cardiovascular:      Rate and Rhythm: Normal rate and regular rhythm.      Heart sounds: Normal heart sounds. No murmur heard.    No gallop.   Pulmonary:      Effort: Pulmonary effort is normal. No respiratory distress.      Breath sounds: Normal breath sounds. No wheezing or rales.   Abdominal:      General: There is no distension.      Palpations: Abdomen is soft.   Musculoskeletal:         General: Normal range of motion.      Cervical back: Normal range of motion and neck supple.      Right lower leg: No edema.      Left lower leg: No edema.   Skin:     General: Skin is warm and dry.      Findings: No rash.   Neurological:      Mental Status: He is alert and oriented to person, place, and time.      Cranial Nerves: No cranial nerve deficit.   Psychiatric:         Mood and Affect: Mood normal.         Behavior: Behavior normal.       PFTs as reviewed by me personally: as per HPI    Imaging as reviewed by me personally: as per hPI    Assessment:  1. SOB (shortness of breath)  DX-CHEST-2 VIEWS      2. Obstructive airway disease (HCC)  budesonide-formoterol (SYMBICORT) 80-4.5 MCG/ACT Aerosol          Plan:  What the patient actually described to me was fatigue with activity suggesting more deconditioning than asthma.  He does have airflow obstruction but his FVC is also reduced suggesting some potential effect of weight.  Given chronic cough and bronchodilator responsiveness which seem to begin after respiratory infection, this could be consistent with reactive airways disease.  I would like to see how he does with Symbicort 80 as prescribed by cardiology 2 puffs twice daily for minimum of 2 weeks.  No chest imaging therefore we will obtain baseline chest x-ray.  See discussion above.  He  is a non-smoker and although airflow is not completely reversible this would be more consistent with asthma.  Return in about 4 months (around 6/14/2023) for reactive airway disease.

## 2023-02-16 DIAGNOSIS — I10 ESSENTIAL HYPERTENSION, BENIGN: ICD-10-CM

## 2023-02-16 RX ORDER — LISINOPRIL 10 MG/1
10 TABLET ORAL 2 TIMES DAILY
Qty: 180 TABLET | Refills: 3 | Status: SHIPPED | OUTPATIENT
Start: 2023-02-16 | End: 2023-03-25 | Stop reason: SDUPTHER

## 2023-02-22 ENCOUNTER — OFFICE VISIT (OUTPATIENT)
Dept: CARDIOLOGY | Facility: MEDICAL CENTER | Age: 78
End: 2023-02-22
Payer: COMMERCIAL

## 2023-02-22 VITALS
HEIGHT: 72 IN | WEIGHT: 264 LBS | HEART RATE: 83 BPM | BODY MASS INDEX: 35.76 KG/M2 | OXYGEN SATURATION: 95 % | SYSTOLIC BLOOD PRESSURE: 120 MMHG | DIASTOLIC BLOOD PRESSURE: 80 MMHG | RESPIRATION RATE: 16 BRPM

## 2023-02-22 DIAGNOSIS — J44.9 OBSTRUCTIVE AIRWAY DISEASE (HCC): ICD-10-CM

## 2023-02-22 DIAGNOSIS — I10 ESSENTIAL HYPERTENSION, BENIGN: ICD-10-CM

## 2023-02-22 DIAGNOSIS — R06.09 DYSPNEA ON EXERTION: ICD-10-CM

## 2023-02-22 DIAGNOSIS — I50.32 CHRONIC HEART FAILURE WITH PRESERVED EJECTION FRACTION (HCC): Primary | ICD-10-CM

## 2023-02-22 DIAGNOSIS — I11.0 HYPERTENSIVE LEFT VENTRICULAR HYPERTROPHY WITH HEART FAILURE (HCC): ICD-10-CM

## 2023-02-22 DIAGNOSIS — E78.00 HYPERCHOLESTEREMIA: ICD-10-CM

## 2023-02-22 DIAGNOSIS — I51.9 DIASTOLIC DYSFUNCTION, LEFT VENTRICLE: ICD-10-CM

## 2023-02-22 PROBLEM — I11.9 HYPERTENSIVE LEFT VENTRICULAR HYPERTROPHY, WITHOUT HEART FAILURE: Status: ACTIVE | Noted: 2022-11-18

## 2023-02-22 PROCEDURE — 99214 OFFICE O/P EST MOD 30 MIN: CPT | Performed by: INTERNAL MEDICINE

## 2023-02-22 RX ORDER — SPIRONOLACTONE 25 MG/1
25 TABLET ORAL DAILY
Qty: 90 TABLET | Refills: 3 | Status: SHIPPED | OUTPATIENT
Start: 2023-02-22

## 2023-02-22 NOTE — PROGRESS NOTES
CARDIOLOGY established PATIENT:    PCP: JUAN LUIS Benavides    1. Chronic heart failure with preserved ejection fraction (HCC)    2. Essential hypertension, benign    3. Obstructive airway disease (HCC)    4. Dyspnea on exertion    5. Hypertensive left ventricular hypertrophy with heart failure (HCC)    6. Hypercholesteremia    7. Diastolic dysfunction, left ventricle        Carlo Weaver here for SOB and HTN follow up.    Chief Complaint   Patient presents with    Hypertension    Shortness of Breath     F/v Dx: Dyspnea on exertion    Hyperlipidemia       History:     Carlo Weaver is a 77 y.o. male evaluated in our clinic by Dr. Melendez for dyspnea on exertion and abnormal EKG on 8/1/22, and subsequently had a TTE and stress test formed. Has psoriasis and depression. Never smoked, never exposed to tobacco. I saw him in clinic 11/2022 for RENAE after which we focused on HTN management.  He also has mild Raynaud's.     Had COVID January 2020 and ever since been having more SOB issues.    Since the visit 11/2022, we started Lisinopril 10mg 1/2023 and subsequently increased to 10mg am and pm as of beginning of February 2023.    Has mild chronic DENNY without orthopnea or PND. Denies claudication, syncope, presyncope, CP. Stable RENAE for last 2 years.    After his repeat lipid panel 11/2022 we discussed the reasonable recommendation to start moderate intensity statin given his age > 75 yrs. He prefers to avoid if possible.    Home BP log: average 130/80;s mmHg but his machine does nto seem to be accurate. Today in clinic his home blood BP reading was 141/94mmHg and our read was 120/80mmHg.    No added salt to food but does not watch salt intake.    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     Lipid panel 11/16/22:  , HDL 62,      Normal CBC, CrCl, AST and ALT 11/2022.     PFT 9/1/22:  There is evidence of moderate obstruction with air trapping, clinical and radiographic correlation is recommended for  entities such as COPD, overlap syndrome, asthma, among others.  Tacho Ward MD FACP  Pulmonary/Critical Care     TTE 11/16/22:  No prior study is available for comparison.   Normal left ventricular systolic function.   No evidence of valvular abnormality based on Doppler evaluation.  Grade I diastolic dysfunction.  Mild LVH.  Unable to estimate right ventricular systolic   pressure due to an inadequate tricuspid regurgitant jet.     Cardiac nuclear stress test 8/5/22:   NUCLEAR IMAGING INTERPRETATION   No evidence of significant jeopardized viable myocardium or prior myocardial    infarction.   Normal left ventricular size, ejection fraction, and wall motion.   ECG INTERPRETATION   Negative stress ECG for ischemia.  Normal TID 0.98       PE:  /80 (BP Location: Left arm, Patient Position: Sitting, BP Cuff Size: Adult)   Pulse 83   Resp 16   Ht 1.829 m (6')   Wt 120 kg (264 lb)   SpO2 95%   BMI 35.80 kg/m²     Gen: well  HEENT: Symmetric face. Anicteric sclerae. Moist mucus membranes  NECK: No JVD. No lymphadenopathy  CARDIAC: Regular, Normal S1, S2, No murmur  VASCULATURE: carotids are normal bilaterally without bruit  RESP: Clear to auscultation bilaterally  ABD: Soft, non-tender, non-distended  EXT: 1+ lower extremity edema, 2+ bilateral PT pulses.  SKIN: Warm and dry  NEURO: No gross deficits  PSYCH: Appropriate affect, participates in conversation    The ASCVD Risk score (Carly DK, et al., 2019) failed to calculate.    Past Medical History:   Diagnosis Date    Chickenpox     COPD (chronic obstructive pulmonary disease) (HCC)     Cough     Depression     Fatigue     Kosovan measles     Hypertension     Influenza     Mumps     Obesity     Scarlet fever     Shortness of breath     Sputum production     Swelling of lower extremity     Wears glasses     Wheezing      Past Surgical History:   Procedure Laterality Date    PB KNEE SCOPE,MED/LAT MENISECTOMY Left 04/15/2022    Procedure: Left knee  arthroscopic partial medial meniscectomy;  Surgeon: Avel Pierson M.D.;  Location: [facility];  Service: Orthopedics    ARTHROSCOPY, KNEE      HEMORRHOIDECTOMY      TONSILLECTOMY       No Known Allergies  Outpatient Encounter Medications as of 2/22/2023   Medication Sig Dispense Refill    spironolactone (ALDACTONE) 25 MG Tab Take 1 Tablet by mouth every day. 90 Tablet 3    lisinopril (PRINIVIL) 10 MG Tab Take 1 Tablet by mouth 2 times a day. 180 Tablet 3    Spacer/Aero-Holding Chambers Device 1 Device 2 times a day. For use with symbicort and albuterol 1 Each 0    budesonide-formoterol (SYMBICORT) 80-4.5 MCG/ACT Aerosol Inhale 2 Puffs 2 times a day. 1 Each 6    albuterol 108 (90 Base) MCG/ACT Aero Soln inhalation aerosol Inhale 1-2 Puffs every four hours as needed for Shortness of Breath. 1 Each 6    neomycin sulf/polymyx B sulf/HC soln (CORTISPORIN HC SOL) 3.5-95269-6 Solution INSTILL 4 DROPS TO AFFECTED EAR THREE TIMES DAILY      buPROPion (WELLBUTRIN XL) 150 MG XL tablet Take 1 Tablet by mouth 2 times a day.      mirtazapine (REMERON) 45 MG tablet TAKE 3 TABLETS BY MOUTH EVERY NIGHT AT BEDTIME      FLUoxetine (PROZAC) 20 MG Cap Take 1 Capsule by mouth every day.      clonazePAM (KLONOPIN) 1 MG Tab TAKE 1 TABLET BY MOUTH FOUR TIMES DAILY      albuterol (PROVENTIL) 2.5mg/3ml Nebu Soln solution for nebulization Take 3 mL by nebulization every 6 hours as needed for Shortness of Breath. (Patient not taking: Reported on 2/22/2023) 1 Each 3     No facility-administered encounter medications on file as of 2/22/2023.     Social History     Socioeconomic History    Marital status: Single     Spouse name: Not on file    Number of children: Not on file    Years of education: Not on file    Highest education level: Not on file   Occupational History    Not on file   Tobacco Use    Smoking status: Never    Smokeless tobacco: Never   Vaping Use    Vaping Use: Never used   Substance and Sexual Activity     Alcohol use: Not Currently     Alcohol/week: 0.6 oz     Types: 1 Standard drinks or equivalent per week     Comment: once a month    Drug use: Never    Sexual activity: Not on file   Other Topics Concern    Not on file   Social History Narrative    Not on file     Social Determinants of Health     Financial Resource Strain: Not on file   Food Insecurity: Not on file   Transportation Needs: Not on file   Physical Activity: Not on file   Stress: Not on file   Social Connections: Not on file   Intimate Partner Violence: Not on file   Housing Stability: Not on file     Family History   Problem Relation Age of Onset    Lung Disease Mother     Diabetes Mother     Cancer Father     Heart Disease Neg Hx          Studies    No results found for: TSHULTRASEN   No results found for: FREET4   No results found for: HBA1C  No results found for: CHOLSTRLTOT, LDL, HDL, TRIGLYCERIDE    No results found for: SODIUM, POTASSIUM, CHLORIDE, CO2, GLUCOSE, BUN, CREATININE, BUNCREATRAT, GLOMRATE  No results found for: ALKPHOSPHAT, ASTSGOT, ALTSGPT, TBILIRUBIN     Echocardiogram:  No results found for this or any previous visit.        Assessment and Recommendations:    Problem List Items Addressed This Visit       Hypertensive left ventricular hypertrophy, without heart failure    Relevant Medications    spironolactone (ALDACTONE) 25 MG Tab    Dyspnea on exertion    Relevant Medications    spironolactone (ALDACTONE) 25 MG Tab    Obstructive airway disease (HCC)    Hypercholesteremia    Relevant Medications    spironolactone (ALDACTONE) 25 MG Tab    Chronic heart failure with preserved ejection fraction (HCC) - Primary    Relevant Medications    spironolactone (ALDACTONE) 25 MG Tab     Other Visit Diagnoses       Essential hypertension, benign        Relevant Medications    spironolactone (ALDACTONE) 25 MG Tab    Diastolic dysfunction, left ventricle        Relevant Medications    spironolactone (ALDACTONE) 25 MG Tab          Given his  "ongoing dyspnea on exertion, bilateral lower extremity edema, LVH on echocardiography and diastolic dysfunction, most likely Mr. Weaver has chronic HFpEF.  Fortunately stable with no concerning unstable angina or unstable arrhythmias.    Ongoing blood pressure management is very important and I advised him to purchase a new blood pressure machine, upper arm, and to consider Omron as a brand.  And I advised him to send me an updated 1 week log after starting spironolactone using the newer machine.    Recommend starting spironolactone 25 mg daily and based on his progress, we will increase dose and then consider SGLT2 inhibitors (empagliflozin 10 mg daily) for his chronic HFpEF.    He prefers to hold off statin therapy understanding his high ASCVD risk but with the \" reasonable moderate he has a statin initiation recommendation\" given his age >75 yrs old.  Recommend at least annual lipid panel.    We also discussed at length importance of trying to lose weight, eat heart healthy diet and to stay active as much as possible.    Thank you for the opportunity to be involved in Carlo Weaver 's care; and please reach out with any questions or concerns.    Return in about 4 months (around 6/22/2023).    Dashawn Jackson MD, MPH [facility]  Interventional Cardiologist  [facility]   of Clinical Internal Medicine - [facility]    ~ Portions of this note were completed using voice recognition software (Dragon Naturally speaking software) . Occasional transcription errors may have escaped proof reading. I have made every reasonable attempt to correct obvious errors, but I expect that there are errors of grammar and possibly content that I did not discover before finalizing the note. ~  "

## 2023-02-22 NOTE — PATIENT INSTRUCTIONS
Start spironolactone 25mg in the am  Send me an updated am and pm BP log once you get a new machine. Omron is a good brand.

## 2023-03-14 DIAGNOSIS — E78.00 HYPERCHOLESTEREMIA: ICD-10-CM

## 2023-03-14 RX ORDER — ATORVASTATIN CALCIUM 20 MG/1
20 TABLET, FILM COATED ORAL NIGHTLY
Qty: 100 TABLET | Refills: 3 | Status: SHIPPED | OUTPATIENT
Start: 2023-03-14

## 2023-03-25 DIAGNOSIS — I10 ESSENTIAL HYPERTENSION, BENIGN: ICD-10-CM

## 2023-03-25 RX ORDER — LISINOPRIL 10 MG/1
10 TABLET ORAL 2 TIMES DAILY
Qty: 180 TABLET | Refills: 3 | Status: SHIPPED | OUTPATIENT
Start: 2023-03-25

## 2023-06-16 ENCOUNTER — OFFICE VISIT (OUTPATIENT)
Dept: CARDIOLOGY | Facility: MEDICAL CENTER | Age: 78
End: 2023-06-16
Attending: INTERNAL MEDICINE
Payer: COMMERCIAL

## 2023-06-16 VITALS
SYSTOLIC BLOOD PRESSURE: 114 MMHG | OXYGEN SATURATION: 93 % | RESPIRATION RATE: 16 BRPM | BODY MASS INDEX: 34.27 KG/M2 | HEIGHT: 72 IN | WEIGHT: 253 LBS | HEART RATE: 82 BPM | DIASTOLIC BLOOD PRESSURE: 72 MMHG

## 2023-06-16 DIAGNOSIS — I50.32 CHRONIC HEART FAILURE WITH PRESERVED EJECTION FRACTION (HCC): Primary | ICD-10-CM

## 2023-06-16 DIAGNOSIS — E78.00 HYPERCHOLESTEREMIA: ICD-10-CM

## 2023-06-16 DIAGNOSIS — I10 ESSENTIAL HYPERTENSION, BENIGN: ICD-10-CM

## 2023-06-16 DIAGNOSIS — I11.0 HYPERTENSIVE LEFT VENTRICULAR HYPERTROPHY WITH HEART FAILURE (HCC): ICD-10-CM

## 2023-06-16 DIAGNOSIS — R73.03 PREDIABETES: ICD-10-CM

## 2023-06-16 DIAGNOSIS — I51.7 LVH (LEFT VENTRICULAR HYPERTROPHY): ICD-10-CM

## 2023-06-16 DIAGNOSIS — E66.09 CLASS 1 OBESITY DUE TO EXCESS CALORIES WITHOUT SERIOUS COMORBIDITY WITH BODY MASS INDEX (BMI) OF 34.0 TO 34.9 IN ADULT: ICD-10-CM

## 2023-06-16 PROCEDURE — 3074F SYST BP LT 130 MM HG: CPT | Performed by: INTERNAL MEDICINE

## 2023-06-16 PROCEDURE — 99212 OFFICE O/P EST SF 10 MIN: CPT | Performed by: INTERNAL MEDICINE

## 2023-06-16 PROCEDURE — 3078F DIAST BP <80 MM HG: CPT | Performed by: INTERNAL MEDICINE

## 2023-06-16 PROCEDURE — 99214 OFFICE O/P EST MOD 30 MIN: CPT | Performed by: INTERNAL MEDICINE

## 2023-06-16 PROCEDURE — 99213 OFFICE O/P EST LOW 20 MIN: CPT | Performed by: INTERNAL MEDICINE

## 2023-06-16 RX ORDER — DAPAGLIFLOZIN 10 MG/1
10 TABLET, FILM COATED ORAL DAILY
Qty: 90 TABLET | Refills: 3 | Status: SHIPPED | OUTPATIENT
Start: 2023-06-16

## 2023-06-16 NOTE — PROGRESS NOTES
CARDIOLOGY established PATIENT:    PCP: JUAN LUIS Benavides    1. Chronic heart failure with preserved ejection fraction (HCC)    2. Essential hypertension, benign    3. Hypercholesteremia    4. Hypertensive left ventricular hypertrophy with heart failure (HCC)    5. LVH (left ventricular hypertrophy)    6. Class 1 obesity due to excess calories without serious comorbidity with body mass index (BMI) of 34.0 to 34.9 in adult    7. Prediabetes        Carlo Weaver here for HFpEF follow up.    Chief Complaint   Patient presents with    Congestive Heart Failure     F/V Dx: Chronic heart failure with preserved ejection fraction (HCC)    Shortness of Breath     F/V Dx: Dyspnea on exertion       History:     Carlo Waever is a 77 y.o. male evaluated in our clinic by Dr. Melendez for dyspnea on exertion and abnormal EKG on 8/1/22, and subsequently had a TTE and stress test formed. Has psoriasis , Raynaud's and depression. Never smoked, never exposed to tobacco. Seen in my clinic 11/2022 after which we started him on lisinopril 10mg BID and advised on atorvastatin 20mg (prefers to hold off). Last seen in my clinic 2/22/23 we started spironolactone 25mg for HFpEF . Also with Class 1 obesity and prediabetes.     Had COVID January 2020 and ever since been having the SOB issues.     Denies orthopnea, PND, LH, dizziness, syncope, presyncope, CP. Not as much productive sputum. Much less DENNY since starting vivian.  Since starting spironolactone, he noticed less facial hair and no worsening of baseline gynecomastia due to antidepressants.  No breast tenderness.    Walks and gardens for exercise, occasional 5 lbs dumbbells. Lost about 15 lbs over last 12 months.     BP log at home daily: around 110/70's mmHg      No added salt to food but does not watch salt intake.     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     Lipid panel 11/16/22:  , HDL 62,      Normal CBC, CrCl, AST and ALT 11/2022.     PFT 9/1/22:  There is  evidence of moderate obstruction with air trapping, clinical and radiographic correlation is recommended for entities such as COPD, overlap syndrome, asthma, among others.  Tacho Ward MD FACP  Pulmonary/Critical Care     TTE 11/16/22:  No prior study is available for comparison.   Normal left ventricular systolic function.   No evidence of valvular abnormality based on Doppler evaluation.  Grade I diastolic dysfunction.  Mild LVH.  Unable to estimate right ventricular systolic   pressure due to an inadequate tricuspid regurgitant jet.     Cardiac nuclear stress test 8/5/22:   NUCLEAR IMAGING INTERPRETATION   No evidence of significant jeopardized viable myocardium or prior myocardial    infarction.   Normal left ventricular size, ejection fraction, and wall motion.   ECG INTERPRETATION   Negative stress ECG for ischemia.  Normal TID 0.98    Functional status:     NYHA Class: II  I: no symptoms with activity. Normal  II. Mild symptoms with normal physical activity and comfortable at rest.  III: Moderate symptoms with less than normal physical activity. Only comfortable at rest  IV: Severe symptoms with symptoms of heart failure, even at rest.      PE:  /72 (BP Location: Left arm, Patient Position: Sitting, BP Cuff Size: Adult)   Pulse 82   Resp 16   Ht 1.829 m (6')   Wt 115 kg (253 lb)   SpO2 93%   BMI 34.31 kg/m²     Gen: well  HEENT: Symmetric face. Anicteric sclerae. Moist mucus membranes  NECK: No JVD.   CARDIAC: Regular, Normal S1, S2, No murmur  VASCULATURE: carotids are normal bilaterally without bruit  RESP: Clear to auscultation bilaterally   ABD: Soft, non-tender, non-distended  EXT: Trace lower extremity edema, no clubbing or cyanosis  SKIN: Warm and dry  NEURO: No gross deficits  PSYCH: Appropriate affect, participates in conversation    The ASCVD Risk score (Coventry DK, et al., 2019) failed to calculate.    Past Medical History:   Diagnosis Date    Chickenpox     COPD (chronic  obstructive pulmonary disease) (HCC)     Cough     Depression     Fatigue     Mongolian measles     Hypertension     Influenza     Mumps     Obesity     Scarlet fever     Shortness of breath     Sputum production     Swelling of lower extremity     Wears glasses     Wheezing      Past Surgical History:   Procedure Laterality Date    PB KNEE SCOPE,MED/LAT MENISECTOMY Left 04/15/2022    Procedure: Left knee arthroscopic partial medial meniscectomy;  Surgeon: Avel Pierson M.D.;  Location: [facility];  Service: Orthopedics    ARTHROSCOPY, KNEE      HEMORRHOIDECTOMY      TONSILLECTOMY       No Known Allergies  Outpatient Encounter Medications as of 6/16/2023   Medication Sig Dispense Refill    dapagliflozin propanediol (FARXIGA) 10 MG Tab Take 1 Tablet by mouth every day. 90 Tablet 3    lisinopril (PRINIVIL) 10 MG Tab Take 1 Tablet by mouth 2 times a day. 180 Tablet 3    atorvastatin (LIPITOR) 20 MG Tab Take 1 Tablet by mouth every evening. 100 Tablet 3    spironolactone (ALDACTONE) 25 MG Tab Take 1 Tablet by mouth every day. 90 Tablet 3    Spacer/Aero-Holding Chambers Device 1 Device 2 times a day. For use with symbicort and albuterol 1 Each 0    budesonide-formoterol (SYMBICORT) 80-4.5 MCG/ACT Aerosol Inhale 2 Puffs 2 times a day. 1 Each 6    albuterol 108 (90 Base) MCG/ACT Aero Soln inhalation aerosol Inhale 1-2 Puffs every four hours as needed for Shortness of Breath. 1 Each 6    neomycin sulf/polymyx B sulf/HC soln (CORTISPORIN HC SOL) 3.5-68261-2 Solution INSTILL 4 DROPS TO AFFECTED EAR THREE TIMES DAILY      buPROPion (WELLBUTRIN XL) 150 MG XL tablet Take 1 Tablet by mouth 2 times a day.      mirtazapine (REMERON) 45 MG tablet TAKE 3 TABLETS BY MOUTH EVERY NIGHT AT BEDTIME      FLUoxetine (PROZAC) 20 MG Cap Take 1 Capsule by mouth every day.      clonazePAM (KLONOPIN) 1 MG Tab TAKE 1 TABLET BY MOUTH FOUR TIMES DAILY      albuterol (PROVENTIL) 2.5mg/3ml Nebu Soln solution for nebulization  Take 3 mL by nebulization every 6 hours as needed for Shortness of Breath. (Patient not taking: Reported on 6/16/2023) 1 Each 3     No facility-administered encounter medications on file as of 6/16/2023.     Social History     Socioeconomic History    Marital status: Single     Spouse name: Not on file    Number of children: Not on file    Years of education: Not on file    Highest education level: Not on file   Occupational History    Not on file   Tobacco Use    Smoking status: Never    Smokeless tobacco: Never   Vaping Use    Vaping Use: Never used   Substance and Sexual Activity    Alcohol use: Not Currently     Alcohol/week: 0.6 oz     Types: 1 Standard drinks or equivalent per week     Comment: once a month    Drug use: Never    Sexual activity: Not on file   Other Topics Concern    Not on file   Social History Narrative    Not on file     Social Determinants of Health     Financial Resource Strain: Not on file   Food Insecurity: Not on file   Transportation Needs: Not on file   Physical Activity: Not on file   Stress: Not on file   Social Connections: Not on file   Intimate Partner Violence: Not on file   Housing Stability: Not on file     Family History   Problem Relation Age of Onset    Lung Disease Mother     Diabetes Mother     Cancer Father     Heart Disease Neg Hx          Studies    No results found for: TSHULTRASEN   No results found for: FREET4   No results found for: HBA1C  No results found for: CHOLSTRLTOT, LDL, HDL, TRIGLYCERIDE    No results found for: SODIUM, POTASSIUM, CHLORIDE, CO2, GLUCOSE, BUN, CREATININE, BUNCREATRAT, GLOMRATE  No results found for: ALKPHOSPHAT, ASTSGOT, ALTSGPT, TBILIRUBIN     Echocardiogram:  No results found for this or any previous visit.        Assessment and Recommendations:    Problem List Items Addressed This Visit       Hypertensive left ventricular hypertrophy with heart failure (HCC)    Hypercholesteremia    Relevant Orders    Lipid Profile    Chronic heart  failure with preserved ejection fraction (HCC) - Primary    Relevant Medications    dapagliflozin propanediol (FARXIGA) 10 MG Tab    Other Relevant Orders    Comp Metabolic Panel     Other Visit Diagnoses       Essential hypertension, benign        LVH (left ventricular hypertrophy)        Class 1 obesity due to excess calories without serious comorbidity with body mass index (BMI) of 34.0 to 34.9 in adult        Relevant Medications    dapagliflozin propanediol (FARXIGA) 10 MG Tab    Prediabetes        Relevant Orders    HEMOGLOBIN A1C (Glycohemoglobin GHB Total/A1C with MBG Estimate)          Mr. Weaver is doing better from chronic HFpEF standpoint.  Discussed with him the option of adding empagliflozin 10 mg to his regimen for better optimization of his chronic HFpEF in addition to spironolactone 25 mg and he is in agreement.  Prior authorization will be processed for this.  If she is unable to obtain this medication due to cost, we will increase spironolactone to 50 mg.    Blood pressure is normal and at goal on lisinopril 10 mg twice daily and spironolactone 25 mg. Target < 130/80mmHg    Ordered CMP, lipid panel and A1C to be done by 11/2023. Target LDL < 100 and TG < 150, atorvastatin dose can be increased to 40mg if LDL is not yet at goal.    Recommend at least annual follow-up with PCP and offered him dermatology referral for his skin related questions but he prefers to follow-up with his PCP instead.    Congratulated patient on his efforts to try to stay active.    Thank you for the opportunity to be involved in Carlo Weaver 's care; and please reach out with any questions or concerns.    Return in about 6 months (around 12/16/2023).    Dashawn Jackson MD, MPH [facility]  Interventional Cardiologist  [facility]   of Clinical Internal Medicine - [facility]    ~ Portions of this note were completed using voice recognition software  (Shahram Naturally speaking software) . Occasional transcription errors may have escaped proof reading. I have made every reasonable attempt to correct obvious errors, but I expect that there are errors of grammar and possibly content that I did not discover before finalizing the note. ~

## 2023-06-16 NOTE — PATIENT INSTRUCTIONS
Dapagliflozin 10mg daily  Let me know if you are unable to afford it so we increase dose of spironolactone  Fasting Labs ordered: our fax is [phone] (CMP, lipid panel and A1C)

## 2023-06-20 ENCOUNTER — TELEPHONE (OUTPATIENT)
Dept: CARDIOLOGY | Facility: MEDICAL CENTER | Age: 78
End: 2023-06-20
Payer: COMMERCIAL

## 2023-06-20 NOTE — TELEPHONE ENCOUNTER
Attempted [phone], no voicemail. Does he want to use [facility] for meds or local pharmacy? FA options available

## 2023-07-19 ENCOUNTER — OFFICE VISIT (OUTPATIENT)
Dept: SLEEP MEDICINE | Facility: MEDICAL CENTER | Age: 78
End: 2023-07-19
Attending: INTERNAL MEDICINE
Payer: COMMERCIAL

## 2023-07-19 VITALS
OXYGEN SATURATION: 92 % | WEIGHT: 246.9 LBS | SYSTOLIC BLOOD PRESSURE: 112 MMHG | DIASTOLIC BLOOD PRESSURE: 62 MMHG | HEIGHT: 72 IN | HEART RATE: 88 BPM | BODY MASS INDEX: 33.44 KG/M2

## 2023-07-19 DIAGNOSIS — R06.02 SOB (SHORTNESS OF BREATH): ICD-10-CM

## 2023-07-19 DIAGNOSIS — J44.9 OBSTRUCTIVE AIRWAY DISEASE (HCC): ICD-10-CM

## 2023-07-19 DIAGNOSIS — I50.32 CHRONIC DIASTOLIC HEART FAILURE (HCC): ICD-10-CM

## 2023-07-19 PROCEDURE — 99214 OFFICE O/P EST MOD 30 MIN: CPT | Performed by: INTERNAL MEDICINE

## 2023-07-19 PROCEDURE — 3074F SYST BP LT 130 MM HG: CPT | Performed by: INTERNAL MEDICINE

## 2023-07-19 PROCEDURE — 3078F DIAST BP <80 MM HG: CPT | Performed by: INTERNAL MEDICINE

## 2023-07-19 PROCEDURE — 99212 OFFICE O/P EST SF 10 MIN: CPT | Performed by: INTERNAL MEDICINE

## 2023-07-19 ASSESSMENT — ENCOUNTER SYMPTOMS
NAUSEA: 0
SINUS PAIN: 0
WHEEZING: 0
WEIGHT LOSS: 0
DIZZINESS: 0
WEAKNESS: 0
SPUTUM PRODUCTION: 0
CONSTIPATION: 0
DEPRESSION: 0
FOCAL WEAKNESS: 0
FALLS: 0
EYE REDNESS: 0
MYALGIAS: 0
HEMOPTYSIS: 0
BLURRED VISION: 0
ORTHOPNEA: 0
SPEECH CHANGE: 0
PND: 0
CLAUDICATION: 0
COUGH: 0
TREMORS: 0
NECK PAIN: 0
ABDOMINAL PAIN: 0
STRIDOR: 0
DOUBLE VISION: 0
DIARRHEA: 0
SORE THROAT: 0
PHOTOPHOBIA: 0
BACK PAIN: 0
EYE DISCHARGE: 0
VOMITING: 0
FEVER: 0
PALPITATIONS: 0
SHORTNESS OF BREATH: 1
CHILLS: 0
HEADACHES: 0
HEARTBURN: 0
DIAPHORESIS: 0
EYE PAIN: 0

## 2023-07-19 ASSESSMENT — PATIENT HEALTH QUESTIONNAIRE - PHQ9
CLINICAL INTERPRETATION OF PHQ2 SCORE: 5
5. POOR APPETITE OR OVEREATING: 3 - NEARLY EVERY DAY
SUM OF ALL RESPONSES TO PHQ QUESTIONS 1-9: 15

## 2023-07-19 NOTE — PROGRESS NOTES
"Chief Complaint   Patient presents with    Follow-Up     LAST SEEN 2/14/23         HPI: This patient is a 77 y.o. male whom is followed in our clinic for RENAE and obstructive lung disease last seen by me on 2/14/23.  PMHx is significant for HTN, MDD and recurrent bone spurs. He is a life-long non-smoker. He is a retired pharmacist. He had a severe respiratory infection in 1/2020 which caused cough and respiratory sx for 20 days. No hospitalization. He believes it may have been covid.  He had been seen by cardiology for RENAE and dx with HFpEF. Since then he has been started on spironolactone and Farxiga. He has also lost about 20 lbs. His main complaint was fatigue and decreased exercise tolerance. He reported at our first visit \"fatigue\" with minimal activity, doing yard work in 15 minute intervals. He previously hiked regularly but has not been able to do this since 2014 due to bone spurs and recurrent surgical procedures leaving him fairly inactive.  PFT from 9/2022 showed airflow obstruction with an FEV1/FVC ratio of 62 and an FEV1 of 61% predicted.  He did have trend toward bronchodilator responsiveness with normal total lung capacity, mild air trapping and elevated DLCO. He had been prescribed symbicort 80 but was using mainly on Nemo. We ordered CXR which he did not schedule due to cost. His cough is mainly resolved and his exercise tolerance stable. Edema improved. He has seen sleep medicine but opted out of sleep study due to minimal sxs of clinically significant YANI.     Past Medical History:   Diagnosis Date    Chickenpox     COPD (chronic obstructive pulmonary disease) (HCC)     Cough     Depression     Fatigue     [place] measles     Hypertension     Influenza     Mumps     Obesity     Scarlet fever     Shortness of breath     Sputum production     Swelling of lower extremity     Wears glasses     Wheezing        Social History     Socioeconomic History    Marital status: Single     Spouse name: Not on file "    Number of children: Not on file    Years of education: Not on file    Highest education level: Not on file   Occupational History    Not on file   Tobacco Use    Smoking status: Never    Smokeless tobacco: Never   Vaping Use    Vaping Use: Never used   Substance and Sexual Activity    Alcohol use: Not Currently     Alcohol/week: 0.6 oz     Types: 1 Standard drinks or equivalent per week     Comment: once a month    Drug use: Never    Sexual activity: Not on file   Other Topics Concern    Not on file   Social History Narrative    Not on file     Social Determinants of Health     Financial Resource Strain: Not on file   Food Insecurity: Not on file   Transportation Needs: Not on file   Physical Activity: Not on file   Stress: Not on file   Social Connections: Not on file   Intimate Partner Violence: Not on file   Housing Stability: Not on file       Family History   Problem Relation Age of Onset    Lung Disease Mother     Diabetes Mother     Cancer Father     Heart Disease Neg Hx        Current Outpatient Medications on File Prior to Visit   Medication Sig Dispense Refill    dapagliflozin propanediol (FARXIGA) 10 MG Tab Take 1 Tablet by mouth every day. 90 Tablet 3    lisinopril (PRINIVIL) 10 MG Tab Take 1 Tablet by mouth 2 times a day. 180 Tablet 3    atorvastatin (LIPITOR) 20 MG Tab Take 1 Tablet by mouth every evening. 100 Tablet 3    spironolactone (ALDACTONE) 25 MG Tab Take 1 Tablet by mouth every day. 90 Tablet 3    Spacer/Aero-Holding Chambers Device 1 Device 2 times a day. For use with symbicort and albuterol 1 Each 0    budesonide-formoterol (SYMBICORT) 80-4.5 MCG/ACT Aerosol Inhale 2 Puffs 2 times a day. 1 Each 6    albuterol 108 (90 Base) MCG/ACT Aero Soln inhalation aerosol Inhale 1-2 Puffs every four hours as needed for Shortness of Breath. 1 Each 6    buPROPion (WELLBUTRIN XL) 150 MG XL tablet Take 1 Tablet by mouth 2 times a day.      mirtazapine (REMERON) 45 MG tablet TAKE 3 TABLETS BY MOUTH EVERY  NIGHT AT BEDTIME      FLUoxetine (PROZAC) 20 MG Cap Take 1 Capsule by mouth every day.      clonazePAM (KLONOPIN) 1 MG Tab TAKE 1 TABLET BY MOUTH FOUR TIMES DAILY      neomycin sulf/polymyx B sulf/HC soln (CORTISPORIN HC SOL) 3.5-04252-6 Solution INSTILL 4 DROPS TO AFFECTED EAR THREE TIMES DAILY      albuterol (PROVENTIL) 2.5mg/3ml Nebu Soln solution for nebulization Take 3 mL by nebulization every 6 hours as needed for Shortness of Breath. (Patient not taking: Reported on 6/16/2023) 1 Each 3     No current facility-administered medications on file prior to visit.       Patient has no known allergies.      ROS:   Review of Systems   Constitutional:  Negative for chills, diaphoresis, fever, malaise/fatigue and weight loss.   HENT:  Negative for congestion, ear discharge, ear pain, hearing loss, nosebleeds, sinus pain, sore throat and tinnitus.    Eyes:  Negative for blurred vision, double vision, photophobia, pain, discharge and redness.   Respiratory:  Positive for shortness of breath. Negative for cough, hemoptysis, sputum production, wheezing and stridor.    Cardiovascular:  Negative for chest pain, palpitations, orthopnea, claudication, leg swelling and PND.   Gastrointestinal:  Negative for abdominal pain, constipation, diarrhea, heartburn, nausea and vomiting.   Genitourinary:  Negative for dysuria and urgency.   Musculoskeletal:  Negative for back pain, falls, joint pain, myalgias and neck pain.   Skin:  Negative for itching and rash.   Neurological:  Negative for dizziness, tremors, speech change, focal weakness, weakness and headaches.   Endo/Heme/Allergies:  Negative for environmental allergies.   Psychiatric/Behavioral:  Negative for depression.        /62 (BP Location: Right arm, Patient Position: Sitting, BP Cuff Size: Adult)   Pulse 88   Ht 1.829 m (6')   Wt 112 kg (246 lb 14.4 oz)   SpO2 92%   Physical Exam  Vitals reviewed.   Constitutional:       General: He is not in acute distress.      Appearance: Normal appearance. He is obese.   HENT:      Head: Normocephalic and atraumatic.      Right Ear: External ear normal.      Left Ear: External ear normal.      Nose: Nose normal. No congestion.      Mouth/Throat:      Mouth: Mucous membranes are moist.      Pharynx: Oropharynx is clear. No oropharyngeal exudate.   Eyes:      General: No scleral icterus.     Extraocular Movements: Extraocular movements intact.      Conjunctiva/sclera: Conjunctivae normal.      Pupils: Pupils are equal, round, and reactive to light.   Cardiovascular:      Rate and Rhythm: Normal rate and regular rhythm.      Heart sounds: Normal heart sounds.   Pulmonary:      Effort: No respiratory distress.      Breath sounds: Normal breath sounds. No wheezing or rales.   Abdominal:      General: There is no distension.      Palpations: Abdomen is soft.   Musculoskeletal:         General: Normal range of motion.      Cervical back: Normal range of motion and neck supple.      Right lower leg: No edema.      Left lower leg: No edema.   Skin:     General: Skin is warm and dry.      Findings: No rash.   Neurological:      Mental Status: He is alert and oriented to person, place, and time.      Cranial Nerves: No cranial nerve deficit.   Psychiatric:         Mood and Affect: Mood normal.         Behavior: Behavior normal.       PFTs as reviewed by me personally: as per HPI    Imaging as reviewed by me personally:  as per hPI    Assessment:  1. SOB (shortness of breath)        2. Obstructive airway disease (HCC)  PULMONARY FUNCTION TESTS -Test requested: Complete Pulmonary Function Test      3. Chronic diastolic heart failure (HCC)            Plan:  Chronic and although he has air flow obstruction on PFT, he does not have episodic sxs suggestive of asthma. He is on low dose ICS one puff BID which I encouraged him to experiment with stopping. I also encouraged him to try and incorporate daily cardiovascular activity to build his exercise  tolerance and inhalers can be adjusted based on sxs.   Never smoker. Pattern more suggestive of RAD. See above. Repeat PFT at f/u  Appears well compensated today. Followed by cardiology  Return in about 6 months (around 1/19/2024) for PFT at time of f/u.

## 2023-07-24 ENCOUNTER — PATIENT MESSAGE (OUTPATIENT)
Dept: CARDIOLOGY | Facility: MEDICAL CENTER | Age: 78
End: 2023-07-24
Payer: COMMERCIAL

## 2023-11-21 ENCOUNTER — TELEPHONE (OUTPATIENT)
Dept: HEALTH INFORMATION MANAGEMENT | Facility: OTHER | Age: 78
End: 2023-11-21
Payer: COMMERCIAL

## 2024-04-04 ENCOUNTER — TELEPHONE (OUTPATIENT)
Dept: CARDIOLOGY | Facility: MEDICAL CENTER | Age: 79
End: 2024-04-04
Payer: COMMERCIAL

## 2024-05-22 DIAGNOSIS — I10 ESSENTIAL HYPERTENSION, BENIGN: ICD-10-CM

## 2024-05-22 NOTE — TELEPHONE ENCOUNTER
Is the patient due for a refill? Yes    Was the patient seen the past year? Yes    Date of last office visit: 6/16/23    Does the patient have an upcoming appointment?  No   If yes, When?     Provider to refill:AL    Does the patients insurance require a 100 day supply?  No

## 2024-05-23 RX ORDER — LISINOPRIL 10 MG/1
10 TABLET ORAL 2 TIMES DAILY
Qty: 180 TABLET | Refills: 0 | Status: SHIPPED | OUTPATIENT
Start: 2024-05-23